# Patient Record
Sex: FEMALE | Race: WHITE | Employment: UNEMPLOYED | URBAN - METROPOLITAN AREA
[De-identification: names, ages, dates, MRNs, and addresses within clinical notes are randomized per-mention and may not be internally consistent; named-entity substitution may affect disease eponyms.]

---

## 2019-09-19 PROBLEM — R07.2 PRECORDIAL PAIN: Status: ACTIVE | Noted: 2019-09-19

## 2019-09-19 NOTE — PROGRESS NOTES
for 30 doses. 11/19/12  Yes Srinivasa Lal MD   omeprazole (PRILOSEC) 20 MG capsule Take 1 capsule by mouth Daily for 30 doses. 11/19/12 12/19/12  Srinivasa Lal MD          Allergies:  Augmentin [amoxicillin-pot clavulanate]     Review of Systems:   A 14 point review of symptoms completed. Pertinent positives identified in the HPI, all other review of symptoms negative as below.       Objective   PHYSICAL EXAM:    Vitals:    09/20/19 1525   BP: 104/80   Pulse: 78   SpO2: 98%    Weight: 278 lb (126.1 kg)         General Appearance:  Alert, cooperative, no distress, appears stated age   Head:  Normocephalic, without obvious abnormality, atraumatic   Eyes:  PERRL, conjunctiva/corneas clear   Nose: Nares normal, no drainage or sinus tenderness   Throat: Lips, mucosa, and tongue normal   Neck: Supple, symmetrical, trachea midline, no adenopathy, thyroid: not enlarged, symmetric, no tenderness/mass/nodules, no carotid bruit or JVD   Lungs:   Clear to auscultation bilaterally, respirations unlabored   Chest Wall:  No deformity or tenderness   Heart:  Regular rate and rhythm, S1, S2 normal, no murmur, rub or gallop   Abdomen:   Soft, non-tender, bowel sounds active all four quadrants,  no masses, no organomegaly   Extremities: Extremities normal, atraumatic, no cyanosis or edema   Pulses: 2+ and symmetric   Skin: Skin color, texture, turgor normal, no rashes or lesions   Pysch: Normal mood and affect   Neurologic: Normal gross motor and sensory exam.         Labs   CBC:   Lab Results   Component Value Date    WBC 10.3 11/18/2012    RBC 4.42 11/18/2012    HGB 12.8 11/18/2012    HCT 39.2 11/18/2012    MCV 88.7 11/18/2012    RDW 14.3 11/18/2012     11/18/2012     CMP:  Lab Results   Component Value Date     11/19/2012    K 4.3 11/19/2012     11/19/2012    CO2 26 11/19/2012    BUN 17 11/19/2012    CREATININE 0.8 11/19/2012    GFRAA >60 11/19/2012    AGRATIO 1.4 11/19/2012    GLUCOSE 96 scribed note accurately describes my personal service to the patient.

## 2019-09-20 ENCOUNTER — OFFICE VISIT (OUTPATIENT)
Dept: CARDIOLOGY CLINIC | Age: 39
End: 2019-09-20
Payer: COMMERCIAL

## 2019-09-20 VITALS
HEART RATE: 78 BPM | BODY MASS INDEX: 49.26 KG/M2 | OXYGEN SATURATION: 98 % | DIASTOLIC BLOOD PRESSURE: 80 MMHG | HEIGHT: 63 IN | WEIGHT: 278 LBS | SYSTOLIC BLOOD PRESSURE: 104 MMHG

## 2019-09-20 DIAGNOSIS — R06.02 SOB (SHORTNESS OF BREATH): ICD-10-CM

## 2019-09-20 DIAGNOSIS — R07.2 PRECORDIAL PAIN: ICD-10-CM

## 2019-09-20 PROCEDURE — 99203 OFFICE O/P NEW LOW 30 MIN: CPT | Performed by: INTERNAL MEDICINE

## 2019-09-20 PROCEDURE — 93000 ELECTROCARDIOGRAM COMPLETE: CPT | Performed by: INTERNAL MEDICINE

## 2019-09-20 PROCEDURE — G8427 DOCREV CUR MEDS BY ELIG CLIN: HCPCS | Performed by: INTERNAL MEDICINE

## 2019-09-20 PROCEDURE — G8417 CALC BMI ABV UP PARAM F/U: HCPCS | Performed by: INTERNAL MEDICINE

## 2019-09-20 PROCEDURE — 1036F TOBACCO NON-USER: CPT | Performed by: INTERNAL MEDICINE

## 2019-09-20 RX ORDER — BUPROPION HYDROCHLORIDE 150 MG/1
TABLET ORAL
COMMUNITY
Start: 2019-08-26 | End: 2021-05-13 | Stop reason: ALTCHOICE

## 2019-09-20 RX ORDER — TRAZODONE HYDROCHLORIDE 100 MG/1
TABLET ORAL
COMMUNITY
Start: 2019-08-26

## 2019-09-20 RX ORDER — CHOLECALCIFEROL (VITAMIN D3) 50 MCG
TABLET ORAL
COMMUNITY
Start: 2019-08-26

## 2019-09-20 RX ORDER — TOPIRAMATE 100 MG/1
TABLET, FILM COATED ORAL
COMMUNITY
Start: 2019-08-26

## 2019-09-20 RX ORDER — RIZATRIPTAN BENZOATE 10 MG/1
TABLET, ORALLY DISINTEGRATING ORAL
COMMUNITY
Start: 2019-08-26

## 2019-10-08 ENCOUNTER — HOSPITAL ENCOUNTER (EMERGENCY)
Age: 39
Discharge: HOME | End: 2019-10-08
Attending: EMERGENCY MEDICINE
Payer: COMMERCIAL

## 2019-10-08 VITALS
RESPIRATION RATE: 17 BRPM | SYSTOLIC BLOOD PRESSURE: 116 MMHG | HEIGHT: 63 IN | HEART RATE: 80 BPM | WEIGHT: 240 LBS | OXYGEN SATURATION: 97 % | DIASTOLIC BLOOD PRESSURE: 57 MMHG | TEMPERATURE: 98.5 F | BODY MASS INDEX: 42.52 KG/M2

## 2019-10-08 DIAGNOSIS — J06.9 ACUTE UPPER RESPIRATORY INFECTION: Primary | ICD-10-CM

## 2019-10-08 PROCEDURE — 99282 EMERGENCY DEPT VISIT SF MDM: CPT

## 2019-10-08 PROCEDURE — 6360000002 HC RX W HCPCS: Performed by: EMERGENCY MEDICINE

## 2019-10-08 RX ORDER — OXYMETAZOLINE HYDROCHLORIDE 0.05 G/100ML
2 SPRAY NASAL 2 TIMES DAILY
Qty: 1 BOTTLE | Refills: 0 | Status: SHIPPED | OUTPATIENT
Start: 2019-10-08 | End: 2019-10-11

## 2019-10-08 RX ORDER — DEXAMETHASONE 4 MG/1
12 TABLET ORAL ONCE
Status: COMPLETED | OUTPATIENT
Start: 2019-10-08 | End: 2019-10-08

## 2019-10-08 RX ADMIN — DEXAMETHASONE 12 MG: 4 TABLET ORAL at 04:29

## 2019-10-08 ASSESSMENT — PAIN DESCRIPTION - PAIN TYPE: TYPE: ACUTE PAIN

## 2019-10-08 ASSESSMENT — PAIN SCALES - GENERAL: PAINLEVEL_OUTOF10: 3

## 2019-10-08 ASSESSMENT — PAIN DESCRIPTION - LOCATION: LOCATION: EAR

## 2019-10-08 ASSESSMENT — PAIN DESCRIPTION - ORIENTATION: ORIENTATION: RIGHT

## 2019-11-23 ENCOUNTER — HOSPITAL ENCOUNTER (EMERGENCY)
Age: 39
Discharge: HOME | End: 2019-11-23
Attending: EMERGENCY MEDICINE
Payer: COMMERCIAL

## 2019-11-23 VITALS
RESPIRATION RATE: 18 BRPM | BODY MASS INDEX: 42.51 KG/M2 | HEART RATE: 78 BPM | DIASTOLIC BLOOD PRESSURE: 71 MMHG | TEMPERATURE: 98 F | OXYGEN SATURATION: 96 % | SYSTOLIC BLOOD PRESSURE: 119 MMHG | WEIGHT: 240 LBS

## 2019-11-23 DIAGNOSIS — G43.901 STATUS MIGRAINOSUS: Primary | ICD-10-CM

## 2019-11-23 PROCEDURE — 96375 TX/PRO/DX INJ NEW DRUG ADDON: CPT

## 2019-11-23 PROCEDURE — 96361 HYDRATE IV INFUSION ADD-ON: CPT

## 2019-11-23 PROCEDURE — 2580000003 HC RX 258: Performed by: EMERGENCY MEDICINE

## 2019-11-23 PROCEDURE — 96374 THER/PROPH/DIAG INJ IV PUSH: CPT

## 2019-11-23 PROCEDURE — 6360000002 HC RX W HCPCS: Performed by: EMERGENCY MEDICINE

## 2019-11-23 PROCEDURE — 99282 EMERGENCY DEPT VISIT SF MDM: CPT

## 2019-11-23 RX ORDER — DIPHENHYDRAMINE HYDROCHLORIDE 50 MG/ML
25 INJECTION INTRAMUSCULAR; INTRAVENOUS ONCE
Status: COMPLETED | OUTPATIENT
Start: 2019-11-23 | End: 2019-11-23

## 2019-11-23 RX ORDER — METOCLOPRAMIDE 10 MG/1
10 TABLET ORAL 4 TIMES DAILY PRN
Qty: 20 TABLET | Refills: 0 | Status: SHIPPED | OUTPATIENT
Start: 2019-11-23

## 2019-11-23 RX ORDER — 0.9 % SODIUM CHLORIDE 0.9 %
1000 INTRAVENOUS SOLUTION INTRAVENOUS ONCE
Status: COMPLETED | OUTPATIENT
Start: 2019-11-23 | End: 2019-11-23

## 2019-11-23 RX ORDER — KETOROLAC TROMETHAMINE 30 MG/ML
15 INJECTION, SOLUTION INTRAMUSCULAR; INTRAVENOUS ONCE
Status: COMPLETED | OUTPATIENT
Start: 2019-11-23 | End: 2019-11-23

## 2019-11-23 RX ORDER — METOCLOPRAMIDE HYDROCHLORIDE 5 MG/ML
10 INJECTION INTRAMUSCULAR; INTRAVENOUS ONCE
Status: COMPLETED | OUTPATIENT
Start: 2019-11-23 | End: 2019-11-23

## 2019-11-23 RX ADMIN — METOCLOPRAMIDE 10 MG: 5 INJECTION, SOLUTION INTRAMUSCULAR; INTRAVENOUS at 21:46

## 2019-11-23 RX ADMIN — KETOROLAC TROMETHAMINE 15 MG: 30 INJECTION, SOLUTION INTRAMUSCULAR at 21:46

## 2019-11-23 RX ADMIN — SODIUM CHLORIDE 1000 ML: 9 INJECTION, SOLUTION INTRAVENOUS at 21:45

## 2019-11-23 RX ADMIN — DIPHENHYDRAMINE HYDROCHLORIDE 25 MG: 50 INJECTION, SOLUTION INTRAMUSCULAR; INTRAVENOUS at 21:46

## 2019-11-23 ASSESSMENT — PAIN SCALES - GENERAL
PAINLEVEL_OUTOF10: 8
PAINLEVEL_OUTOF10: 3
PAINLEVEL_OUTOF10: 8

## 2019-11-23 ASSESSMENT — PAIN DESCRIPTION - PAIN TYPE: TYPE: ACUTE PAIN

## 2019-11-23 ASSESSMENT — PAIN DESCRIPTION - LOCATION: LOCATION: HEAD

## 2021-02-26 ENCOUNTER — APPOINTMENT (OUTPATIENT)
Dept: GENERAL RADIOLOGY | Age: 41
End: 2021-02-26
Payer: COMMERCIAL

## 2021-02-26 ENCOUNTER — HOSPITAL ENCOUNTER (EMERGENCY)
Age: 41
Discharge: HOME | End: 2021-02-26
Attending: EMERGENCY MEDICINE
Payer: COMMERCIAL

## 2021-02-26 VITALS
OXYGEN SATURATION: 100 % | DIASTOLIC BLOOD PRESSURE: 72 MMHG | HEIGHT: 63 IN | WEIGHT: 240 LBS | SYSTOLIC BLOOD PRESSURE: 139 MMHG | HEART RATE: 74 BPM | TEMPERATURE: 98 F | BODY MASS INDEX: 42.52 KG/M2 | RESPIRATION RATE: 16 BRPM

## 2021-02-26 DIAGNOSIS — S99.911A INJURY OF RIGHT ANKLE, INITIAL ENCOUNTER: Primary | ICD-10-CM

## 2021-02-26 PROCEDURE — 99283 EMERGENCY DEPT VISIT LOW MDM: CPT

## 2021-02-26 PROCEDURE — 73610 X-RAY EXAM OF ANKLE: CPT

## 2021-02-26 ASSESSMENT — PAIN SCALES - GENERAL: PAINLEVEL_OUTOF10: 7

## 2021-02-26 ASSESSMENT — PAIN DESCRIPTION - LOCATION: LOCATION: ANKLE

## 2021-02-26 NOTE — ED NOTES
Applied a M sized [employer] air boot to the pt's right ankle for added pt comfort and support. Instructed pt of all at home care for the boot; pt understood all directions and had no additional questions.        Manda Ashton  02/26/21 9132

## 2021-02-27 ASSESSMENT — ENCOUNTER SYMPTOMS: COLOR CHANGE: 0

## 2021-02-28 NOTE — ED PROVIDER NOTES
[facility]  ED  EMERGENCYDEPARTMENT ENCOUNTER      Pt Name: Adina Macias  MRN: [id number]  Yulissagfsallie 1980  Date of evaluation: 2/26/2021  Liliane Martins MD    CHIEF COMPLAINT       Chief Complaint   Patient presents with    Fall     fall last saturday and now has right ankle pain and swelling. Pt ambulatory from triage to room. HISTORY OF PRESENT ILLNESS   (Location/Symptom, Timing/Onset,Context/Setting, Quality, Duration, Modifying Factors, Severity)  Note limiting factors. Adina Macias is a 38 y.o. female who presents to the emergency department for right ankle pain. Patient reports she slipped and fell last week and since then has continued to have pain and swelling to right ankle. Has taken ibuprofen with improvement of symptoms. Denies numbness/tingling. HPI    Nursing Notes were reviewed. REVIEW OF SYSTEMS    (2-9 systems for level 4, 10 or more for level 5)     Review of Systems   Musculoskeletal: Positive for gait problem and joint swelling. Skin: Negative for color change. Except as noted above the remainder of the review of systems was reviewedand negative. PAST MEDICAL HISTORY   History reviewed. No pertinent past medical history. SURGICAL HISTORY     History reviewed. No pertinent surgical history. CURRENT MEDICATIONS       Discharge Medication List as of 2/26/2021  4:50 PM      CONTINUE these medications which have NOT CHANGED    Details   metoclopramide (REGLAN) 10 MG tablet Take 1 tablet by mouth 4 times daily as needed (headache) Please take 25 mg Benadryl pill with this medicine. You may take additional Benadryl if you have any unusual muscle movements. , Disp-20 tablet, R-0Print      benzocaine-menthol (CEPACOL SORE THROAT) 15-3.6 MG lozenge Take 1 lozenge by mouth every 2 hours as needed for Sore Throat, Disp-20 lozenge, R-0Print      rizatriptan (MAXALT-MLT) 10 MG disintegrating tablet Historical Med      topiramate (TOPAMAX) SCREENINGS             PHYSICAL EXAM    (up to 7 for level 4, 8 ormore for level 5)     ED Triage Vitals   BP Temp Temp Source Pulse Resp SpO2 Height Weight   02/26/21 1611 02/26/21 1611 02/26/21 1611 02/26/21 1557 02/26/21 1611 02/26/21 1557 02/26/21 1608 02/26/21 1608   (!) 141/72 98 °F (36.7 °C) Oral 102 16 96 % 5' 3\" (1.6 m) 240 lb (108.9 kg)       Physical Exam  Vitals signs and nursing note reviewed. Constitutional:       General: She is not in acute distress. Appearance: She is well-developed. She is not ill-appearing, toxic-appearing or diaphoretic. Comments: Well-appearing, nontoxic, not in acute distress. Answers questions in full sentences and following verbal commands appropriately   HENT:      Head: Normocephalic and atraumatic. Eyes:      General:         Right eye: No discharge. Left eye: No discharge. Conjunctiva/sclera: Conjunctivae normal.   Neck:      Musculoskeletal: Normal range of motion and neck supple. Cardiovascular:      Pulses:           Dorsalis pedis pulses are 2+ on the right side. Pulmonary:      Effort: Pulmonary effort is normal. No respiratory distress. Musculoskeletal: Normal range of motion. Right ankle: She exhibits swelling. Tenderness. Lateral malleolus tenderness found. Skin:     Coloration: Skin is not pale. Neurological:      Mental Status: She is alert and oriented to person, place, and time. Psychiatric:         Behavior: Behavior normal. Behavior is cooperative.          DIAGNOSTIC RESULTS     EKG: All EKG's are interpreted by the Emergency Department Physicianwho either signs or Co-signs this chart in the absence of a cardiologist.      RADIOLOGY:   Non-plain film images such as CT, Ultrasound and MRI are read by the radiologist. Plain radiographic images are visualized and preliminarily interpreted by the emergency physician with the below findings:      Interpretation per the Radiologist below, if available at the time of this note:    XR ANKLE RIGHT (MIN 3 VIEWS)   Final Result   No acute osseous abnormality of the ankle. Lateral ankle soft tissue swelling. ED BEDSIDE ULTRASOUND:   Performed by ED Physician - none    LABS:  Labs Reviewed - No data to display    All other labs were within normal range ornot returned as of this dictation. EMERGENCY DEPARTMENT COURSE and DIFFERENTIAL DIAGNOSIS/MDM:   Vitals:    Vitals:    02/26/21 1557 02/26/21 1608 02/26/21 1611 02/26/21 1659   BP:   (!) 141/72 139/72   Pulse: 102  81 74   Resp:   16 16   Temp:   98 °F (36.7 °C) 98 °F (36.7 °C)   TempSrc:   Oral Oral   SpO2: 96%  98% 100%   Weight:  240 lb (108.9 kg)     Height:  5' 3\" (1.6 m)           MDM    ED COURSE/MDM    -Jonelle Pedroza is a 38 y.o. female to ED for right ankle pain and swelling has been going on for several days after a fall. On examination there is some slight swelling to lateral malleolus which is where patient has maximal pain. DP pulse intact with brisk cap refill. -X-ray of right ankle shows no acute osseous abnormality of the ankle. Lateral ankle soft tissue swelling.  - imaging reviewed and results discussed with patient. the patient does not have any acute findings, as she expressed that it is difficult to bear weight, she will be put on a walker boot for comfort purposes. Instructed her to use it as needed however can discontinue when symptoms improve. Directed to take Tylenol or ibuprofen for pain management, can also apply ice to the area. Strict ED return precautions given for new/worsending symptoms. Patient in agreement withplan, verbally confirm understanding and have no further questions/concerns. REASSESSMENT      Well appearing, non toxic, alert, oriented speaking in full sentences and hemodynamically stable upon discharge      CRITICAL CARE TIME   Total Critical Care time was 0 minutes, excluding separately reportableprocedures.   There was a high probability of

## 2021-05-13 ENCOUNTER — HOSPITAL ENCOUNTER (EMERGENCY)
Age: 41
Discharge: LEFT BEFORE DISCHARGE | End: 2021-05-13
Attending: EMERGENCY MEDICINE
Payer: COMMERCIAL

## 2021-05-13 VITALS
WEIGHT: 240 LBS | SYSTOLIC BLOOD PRESSURE: 148 MMHG | HEIGHT: 64 IN | DIASTOLIC BLOOD PRESSURE: 93 MMHG | TEMPERATURE: 98.1 F | RESPIRATION RATE: 17 BRPM | OXYGEN SATURATION: 100 % | BODY MASS INDEX: 40.97 KG/M2 | HEART RATE: 104 BPM

## 2021-05-13 DIAGNOSIS — R19.7 NAUSEA VOMITING AND DIARRHEA: ICD-10-CM

## 2021-05-13 DIAGNOSIS — R11.2 NAUSEA VOMITING AND DIARRHEA: ICD-10-CM

## 2021-05-13 DIAGNOSIS — H66.90 OTITIS MEDIA, UNSPECIFIED LATERALITY, UNSPECIFIED OTITIS MEDIA TYPE: Primary | ICD-10-CM

## 2021-05-13 PROCEDURE — 99283 EMERGENCY DEPT VISIT LOW MDM: CPT

## 2021-05-13 RX ORDER — AZITHROMYCIN 250 MG/1
TABLET, FILM COATED ORAL
Qty: 1 PACKET | Refills: 0 | Status: SHIPPED | OUTPATIENT
Start: 2021-05-13 | End: 2021-05-23

## 2021-05-13 RX ORDER — ONDANSETRON 4 MG/1
4 TABLET, ORALLY DISINTEGRATING ORAL ONCE
Status: DISCONTINUED | OUTPATIENT
Start: 2021-05-13 | End: 2021-05-13 | Stop reason: HOSPADM

## 2021-05-13 RX ORDER — ONDANSETRON 4 MG/1
4 TABLET, ORALLY DISINTEGRATING ORAL EVERY 8 HOURS PRN
Qty: 20 TABLET | Refills: 0 | Status: SHIPPED | OUTPATIENT
Start: 2021-05-13

## 2021-05-13 NOTE — ED NOTES
Went to room to medication pt and no one in room. Will consider pt eloped. No discharge papers able to be given.       Jolanta Perez RN  05/13/21 7011

## 2021-05-13 NOTE — ED PROVIDER NOTES
[address] ED    CHIEF COMPLAINT  Emesis (Patient seen by urgent care earlier today and diagnosed with bilateral ear infection. Patient vomited 4 times after taking amoxicillin tonight.)       HISTORY OF PRESENT ILLNESS  Susy Fonseca is a 38 y.o. female who presents to the ED with complaint of ear and sinus pain. Was seen at an Urgent Care yesterday and was prescribed prednisone and amoxicillin. Several hours later, developed several episodes of vomiting. 4 episodes of vomiting in two hours. Denies fever, chest pain, SOB. Diarrhea started on arrival to ED. Emesis and diarrhea are nonbloody. Complains of diffuse abdominal pain, stabbing, constant, no exacerbating or ameliorating factors, 7/10 intensity. Denies dysuria, hematuria, vaginal bleeding/discharge. Patient is s/p tubal ligation, declines pregnancy test.    No other complaints, modifying factors or associated symptoms. I have reviewed the following from the nursing documentation:    No past medical history on file. No past surgical history on file. No family history on file. Social History     Socioeconomic History    Marital status:      Spouse name: Not on file    Number of children: Not on file    Years of education: Not on file    Highest education level: Not on file   Occupational History    Not on file   Social Needs    Financial resource strain: Not on file    Food insecurity     Worry: Not on file     Inability: Not on file    Transportation needs     Medical: Not on file     Non-medical: Not on file   Tobacco Use    Smoking status: Never Smoker    Smokeless tobacco: Never Used   Substance and Sexual Activity    Alcohol use:  Yes    Drug use: Never    Sexual activity: Yes     Partners: Male   Lifestyle    Physical activity     Days per week: Not on file     Minutes per session: Not on file    Stress: Not on file   Relationships    Social connections     Talks on phone: Not on file     Gets together: Not on file     Attends Nondenominational service: Not on file     Active member of club or organization: Not on file     Attends meetings of clubs or organizations: Not on file     Relationship status: Not on file    Intimate partner violence     Fear of current or ex partner: Not on file     Emotionally abused: Not on file     Physically abused: Not on file     Forced sexual activity: Not on file   Other Topics Concern    Not on file   Social History Narrative    Not on file     Current Facility-Administered Medications   Medication Dose Route Frequency Provider Last Rate Last Admin    ondansetron (ZOFRAN-ODT) disintegrating tablet 4 mg  4 mg Oral Once Melissa Gonzalez MD         Current Outpatient Medications   Medication Sig Dispense Refill    azithromycin (ZITHROMAX) 250 MG tablet Take 2 tablets (500 mg) on Day 1, followed by 1 tablet (250 mg) once daily on Days 2 through 5. 1 packet 0    ondansetron (ZOFRAN ODT) 4 MG disintegrating tablet Take 1 tablet by mouth every 8 hours as needed for Nausea 20 tablet 0    metoclopramide (REGLAN) 10 MG tablet Take 1 tablet by mouth 4 times daily as needed (headache) Please take 25 mg Benadryl pill with this medicine. You may take additional Benadryl if you have any unusual muscle movements. 20 tablet 0    benzocaine-menthol (CEPACOL SORE THROAT) 15-3.6 MG lozenge Take 1 lozenge by mouth every 2 hours as needed for Sore Throat 20 lozenge 0    rizatriptan (MAXALT-MLT) 10 MG disintegrating tablet       topiramate (TOPAMAX) 100 MG tablet       traZODone (DESYREL) 100 MG tablet       Cholecalciferol (VITAMIN D) 2000 units TABS tablet       diclofenac (VOLTAREN) 75 MG EC tablet Take 1 tablet by mouth 2 times daily as needed for Pain. 30 tablet 0    omeprazole (PRILOSEC) 20 MG capsule Take 1 capsule by mouth Daily for 30 doses. 30 capsule 0    dicyclomine (BENTYL) 20 MG tablet Take 1 tablet by mouth every 6 hours as needed (abdominal cramping/pain) for 30 doses.  30 tablet 0 Allergies   Allergen Reactions    Augmentin [Amoxicillin-Pot Clavulanate]      GI issues       REVIEW OF SYSTEMS  10 systems reviewed, pertinent positives and negatives per HPI, otherwise noted to be negative. PHYSICAL EXAM  ED Triage Vitals [05/13/21 0129]   BP Temp Temp Source Pulse Resp SpO2 Height Weight   138/88 98.1 °F (36.7 °C) Oral 104 17 97 % 5' 4\" (1.626 m) 240 lb (108.9 kg)     General appearance: Awake and alert. Cooperative. No acute distress. HENT: Normocephalic. Atraumatic. Mucous membranes are moist. EACs with trace cerumen. TMs WNL. No mastoid tenderness. Neck: Supple. No meningismus. Eyes: PERRL. EOMI. Heart/Chest: RRR. No murmurs. Lungs: Respirations unlabored. CTAB. Good air exchange. Speaking comfortably in full sentences. Abdomen: Soft. Non-tender. Non-distended. No rebound or guarding. Musculoskeletal: No extremity edema. No deformity. No tenderness in the extremities. All extremities neurovascularly intact. Skin: Warm and dry. No acute rashes. Neurological: Alert and oriented. CN II-XII intact. Strength 5/5 bilateral upper and lower extremities. Sensation intact to light touch. Gait normal.  Psychiatric: Mood/affect: normal      LABS  I have reviewed all labs for this visit. No results found for this visit on 05/13/21. RADIOLOGY  I have reviewed all radiographic studies for this visit. No orders to display        ED COURSE/MDM  Patient seen and evaluated. Old records reviewed. Labs and imaging reviewed and results discussed with patient/family to extent possible. This is a 69-year-old female who presents with nausea, vomiting, diarrhea in the setting of starting amoxicillin for otitis media. On arrival the patient is well in appearance. Initial vitals notable for mild tachycardia. Examination of the ears bilaterally shows no evidence of acute otitis media. There is no concern for mastoiditis. Patient's abdominal exam is benign.   As I am not sure what the initial evaluation of the ears demonstrated, we will plan to discontinue amoxicillin given the Augmentin allergy. Will prescribe Z-Jorge instead. We will also prescribe Zofran. 4 mg of Zofran administered here. The abdominal complaints are not consistent with appendicitis, pyelonephritis, bowel obstruction, bowel perforation, ovarian pathology. Discharged home with the above medications, close follow-up with PCP in several days, usual strict return precautions for new or worsening symptoms communicated. During the patient's ED course, the patient was given:  Medications   ondansetron (ZOFRAN-ODT) disintegrating tablet 4 mg (has no administration in time range)        All questions were answered and the patient/family expressed understanding and agreement with the plan. PROCEDURES  None    CRITICAL CARE  N/A    CLINICAL IMPRESSION  1. Otitis media, unspecified laterality, unspecified otitis media type    2. Nausea vomiting and diarrhea        DISPOSITION   discharge     Danette Casillas MD    Note: This chart was created using voice recognition dictation software. Efforts were made by me to ensure accuracy, however some errors may be present due to limitations of this technology and occasionally words are not transcribed correctly.         Danette Casillas MD  05/13/21 4541

## 2021-05-13 NOTE — ED NOTES
Notified Dr. Terri Evans that pt not in room and considered eloped.       Edmundo Hopson RN  05/13/21 7136

## 2021-12-30 ENCOUNTER — HOSPITAL ENCOUNTER (OUTPATIENT)
Dept: GENERAL RADIOLOGY | Age: 41
Discharge: HOME | End: 2021-12-30
Payer: COMMERCIAL

## 2021-12-30 ENCOUNTER — HOSPITAL ENCOUNTER (OUTPATIENT)
Age: 41
Discharge: HOME | End: 2021-12-30
Payer: COMMERCIAL

## 2021-12-30 DIAGNOSIS — M25.512 ACUTE PAIN OF LEFT SHOULDER: ICD-10-CM

## 2021-12-30 PROCEDURE — 73030 X-RAY EXAM OF SHOULDER: CPT

## 2024-06-09 ENCOUNTER — HOSPITAL ENCOUNTER (EMERGENCY)
Age: 44
Discharge: HOME | End: 2024-06-09
Attending: EMERGENCY MEDICINE
Payer: COMMERCIAL

## 2024-06-09 VITALS
TEMPERATURE: 97.8 F | SYSTOLIC BLOOD PRESSURE: 118 MMHG | HEIGHT: 64 IN | WEIGHT: 262.57 LBS | RESPIRATION RATE: 14 BRPM | HEART RATE: 70 BPM | DIASTOLIC BLOOD PRESSURE: 70 MMHG | BODY MASS INDEX: 44.83 KG/M2 | OXYGEN SATURATION: 100 %

## 2024-06-09 DIAGNOSIS — H92.09 OTALGIA, UNSPECIFIED LATERALITY: Primary | ICD-10-CM

## 2024-06-09 LAB — S PYO AG THROAT QL: NEGATIVE

## 2024-06-09 PROCEDURE — 99283 EMERGENCY DEPT VISIT LOW MDM: CPT

## 2024-06-09 PROCEDURE — 87880 STREP A ASSAY W/OPTIC: CPT

## 2024-06-09 PROCEDURE — 87081 CULTURE SCREEN ONLY: CPT

## 2024-06-09 ASSESSMENT — PAIN SCALES - GENERAL: PAINLEVEL_OUTOF10: 5

## 2024-06-09 ASSESSMENT — PAIN DESCRIPTION - DESCRIPTORS: DESCRIPTORS: ACHING

## 2024-06-09 ASSESSMENT — PAIN DESCRIPTION - FREQUENCY: FREQUENCY: CONTINUOUS

## 2024-06-09 ASSESSMENT — PAIN DESCRIPTION - LOCATION: LOCATION: THROAT;EAR

## 2024-06-09 ASSESSMENT — PAIN - FUNCTIONAL ASSESSMENT
PAIN_FUNCTIONAL_ASSESSMENT: NONE - DENIES PAIN
PAIN_FUNCTIONAL_ASSESSMENT: 0-10

## 2024-06-09 ASSESSMENT — LIFESTYLE VARIABLES
HOW OFTEN DO YOU HAVE A DRINK CONTAINING ALCOHOL: MONTHLY OR LESS
HOW MANY STANDARD DRINKS CONTAINING ALCOHOL DO YOU HAVE ON A TYPICAL DAY: 1 OR 2

## 2024-06-09 ASSESSMENT — PAIN DESCRIPTION - PAIN TYPE: TYPE: ACUTE PAIN

## 2024-06-09 ASSESSMENT — PAIN DESCRIPTION - ORIENTATION: ORIENTATION: LEFT;RIGHT

## 2024-06-09 ASSESSMENT — PAIN DESCRIPTION - ONSET: ONSET: ON-GOING

## 2024-06-09 NOTE — ED PROVIDER NOTES
file     No current facility-administered medications for this encounter.     Current Outpatient Medications   Medication Sig Dispense Refill    rizatriptan (MAXALT-MLT) 10 MG disintegrating tablet       topiramate (TOPAMAX) 100 MG tablet       Cholecalciferol (VITAMIN D) 2000 units TABS tablet        Allergies   Allergen Reactions    Augmentin [Amoxicillin-Pot Clavulanate]      GI issues         PHYSICAL EXAM  ED Triage Vitals [06/09/24 1113]   BP Temp Temp Source Pulse Respirations SpO2 Height Weight - Scale   118/70 97.8 °F (36.6 °C) Oral 70 14 100 % 1.626 m (5' 4\") 119.1 kg (262 lb 9.1 oz)     General appearance: Awake and alert. Cooperative. No acute distress.  HEENT: Normocephalic. Atraumatic. Mucous membranes are moist.  Tympanic membranes are pearly, gray, nonerythematous, nonbulging.  External auditory canals are clear.  No mastoid tenderness to palpation.  Mild diffuse erythema of the posterior pharynx.  No tonsillar exudate or uvular deviation.  Managing secretions easily.   Neck: No meningismus.    Musculoskeletal: No extremity edema. No deformity.   Skin: Warm and dry. No acute rashes.       LABS  I have reviewed all labs for this visit.   Results for orders placed or performed during the hospital encounter of 06/09/24   Strep Screen Group A Throat    Specimen: Throat   Result Value Ref Range    Rapid Strep A Screen Negative Negative       ED COURSE/MDM    43 y.o. female presents with sore throat and ear pain.  Presentation not consistent with acute otitis media or otitis externa.  Not consistent with mastoiditis.  With respect to the sore throat, there is no focal erythema or uvular deviation.  Not consistent with PTA.  Patient afebrile and nontoxic in appearance.  Not consistent with RPA or bacterial tracheitis.  Will trial ibuprofen/acetaminophen, gargle with warm salt water.  Afrin times several days.  Close PCP follow-up.  Usual strict return precautions.     ITate MD, am the primary

## 2024-06-09 NOTE — DISCHARGE INSTRUCTIONS
Dear Stacie Nelson,     Thank you for the privilege of caring for you today in the Emergency Department.     Your rapid strep test was negative. We will contact you if the strep culture is positive.     Take ibuprofen, up to 600 mg four times per day every day with food for the next 3-5 days, then as needed and directed on the bottle for continued pain.You may alternate this with up to 650 mg of acetaminophen (Tylenol), every six hours. Do not take more than 3000 mg of acetaminophen from all sources in a 24-hour period.      Use Afrin, over the counter, as directed on package. Do not use for more than 3 days.     Gargle with warm salt water 3-4 times daily.     Please return to the Emergency Department if you develop any new or worsening symptoms, fever, dizziness, hearing loss, or for any other concerns.     Regards,     Tate Saleem MD, FACEP

## 2024-06-09 NOTE — ED NOTES
Patient presents to the ED via walk in with partner for ear pain and sore throat.  Patient states initial onset was 1 week ago with a sore throat and mild cough. Then states a few days ago bilateral ear pain began with more pain to left than right..  Patient denies fevers, shortness of breath, nausea/vomiting.  Patient alert and oriented, respirations even and easy.

## 2024-06-11 LAB — S PYO THROAT QL CULT: NORMAL

## 2024-11-02 ENCOUNTER — HOSPITAL ENCOUNTER (EMERGENCY)
Age: 44
Discharge: HOME | End: 2024-11-02
Attending: STUDENT IN AN ORGANIZED HEALTH CARE EDUCATION/TRAINING PROGRAM
Payer: COMMERCIAL

## 2024-11-02 ENCOUNTER — APPOINTMENT (OUTPATIENT)
Dept: GENERAL RADIOLOGY | Age: 44
End: 2024-11-02
Payer: COMMERCIAL

## 2024-11-02 VITALS
SYSTOLIC BLOOD PRESSURE: 130 MMHG | HEART RATE: 80 BPM | OXYGEN SATURATION: 97 % | DIASTOLIC BLOOD PRESSURE: 88 MMHG | HEIGHT: 60 IN | BODY MASS INDEX: 49.77 KG/M2 | TEMPERATURE: 98 F | RESPIRATION RATE: 16 BRPM | WEIGHT: 253.53 LBS

## 2024-11-02 DIAGNOSIS — M25.511 CHRONIC RIGHT SHOULDER PAIN: Primary | ICD-10-CM

## 2024-11-02 DIAGNOSIS — G89.29 CHRONIC RIGHT SHOULDER PAIN: Primary | ICD-10-CM

## 2024-11-02 PROCEDURE — 96372 THER/PROPH/DIAG INJ SC/IM: CPT

## 2024-11-02 PROCEDURE — 73030 X-RAY EXAM OF SHOULDER: CPT

## 2024-11-02 PROCEDURE — 6360000002 HC RX W HCPCS: Performed by: STUDENT IN AN ORGANIZED HEALTH CARE EDUCATION/TRAINING PROGRAM

## 2024-11-02 PROCEDURE — 6370000000 HC RX 637 (ALT 250 FOR IP): Performed by: STUDENT IN AN ORGANIZED HEALTH CARE EDUCATION/TRAINING PROGRAM

## 2024-11-02 PROCEDURE — 99284 EMERGENCY DEPT VISIT MOD MDM: CPT

## 2024-11-02 RX ORDER — LIDOCAINE 4 G/G
1 PATCH TOPICAL ONCE
Status: DISCONTINUED | OUTPATIENT
Start: 2024-11-02 | End: 2024-11-02 | Stop reason: HOSPADM

## 2024-11-02 RX ORDER — KETOROLAC TROMETHAMINE 15 MG/ML
15 INJECTION, SOLUTION INTRAMUSCULAR; INTRAVENOUS ONCE
Status: COMPLETED | OUTPATIENT
Start: 2024-11-02 | End: 2024-11-02

## 2024-11-02 RX ADMIN — KETOROLAC TROMETHAMINE 15 MG: 15 INJECTION, SOLUTION INTRAMUSCULAR; INTRAVENOUS at 16:32

## 2024-11-02 ASSESSMENT — PAIN DESCRIPTION - FREQUENCY
FREQUENCY: CONTINUOUS
FREQUENCY: CONTINUOUS

## 2024-11-02 ASSESSMENT — PAIN DESCRIPTION - DESCRIPTORS: DESCRIPTORS: ACHING;SORE

## 2024-11-02 ASSESSMENT — PAIN DESCRIPTION - PAIN TYPE
TYPE: CHRONIC PAIN
TYPE: CHRONIC PAIN

## 2024-11-02 ASSESSMENT — PAIN - FUNCTIONAL ASSESSMENT
PAIN_FUNCTIONAL_ASSESSMENT: 0-10
PAIN_FUNCTIONAL_ASSESSMENT: 0-10

## 2024-11-02 ASSESSMENT — PAIN DESCRIPTION - ONSET
ONSET: ON-GOING
ONSET: ON-GOING

## 2024-11-02 ASSESSMENT — PAIN DESCRIPTION - LOCATION: LOCATION: SHOULDER

## 2024-11-02 ASSESSMENT — PAIN SCALES - GENERAL
PAINLEVEL_OUTOF10: 10
PAINLEVEL_OUTOF10: 8
PAINLEVEL_OUTOF10: 10

## 2024-11-02 ASSESSMENT — PAIN DESCRIPTION - ORIENTATION: ORIENTATION: RIGHT

## 2024-11-02 NOTE — DISCHARGE INSTRUCTIONS
Today you are seen here in emergency department for shoulder pain.  Your x-ray does show evidence of arthropathy should be due to arthritis.  Please return for fevers, chills and worsening pain.  Also return for chest pain difficulty    XR SHOULDER RIGHT (MIN 2 VIEWS)   Final Result   Mild acromioclavicular arthropathy.

## 2024-11-02 NOTE — ED PROVIDER NOTES
EMERGENCY DEPARTMENT ENCOUNTER      CHIEF COMPLAINT    No chief complaint on file.      HPI    Stacie Nelson is a 43 y.o. female with past medical history significant for migraine who presents shoulder pain    Here with R shoulder pain x years, that is worse in the last month or so  Reports movement that is worse with overhead motions denies trauma denies chest pain difficulty breathing denies fever chills    PAST MEDICAL HISTORY    Past Medical History:   Diagnosis Date    Migraine        SURGICAL HISTORY    History reviewed. No pertinent surgical history.    CURRENT MEDICATIONS    Current Outpatient Rx   Medication Sig Dispense Refill    QUEtiapine Fumarate (SEROQUEL PO) Take by mouth      rizatriptan (MAXALT-MLT) 10 MG disintegrating tablet       topiramate (TOPAMAX) 100 MG tablet       Cholecalciferol (VITAMIN D) 2000 units TABS tablet          ALLERGIES    Allergies   Allergen Reactions    Augmentin [Amoxicillin-Pot Clavulanate]      GI issues       Family history reviewed and noncontributory other than:  Family History   Problem Relation Age of Onset    Diabetes Mother     Diabetes Father     Diabetes Maternal Grandmother     Diabetes Maternal Grandfather     Diabetes Paternal Grandmother        Social history reviewed and noncontributory other than:  Social History     Socioeconomic History    Marital status:      Spouse name: Not on file    Number of children: Not on file    Years of education: Not on file    Highest education level: Not on file   Occupational History    Not on file   Tobacco Use    Smoking status: Never    Smokeless tobacco: Never   Substance and Sexual Activity    Alcohol use: Yes    Drug use: Never    Sexual activity: Yes     Partners: Male   Other Topics Concern    Not on file   Social History Narrative    Not on file     Social Determinants of Health     Financial Resource Strain: Not on file   Food Insecurity: Not on file   Transportation Needs: Not on file   Physical Activity:

## 2024-11-02 NOTE — ED NOTES
Dc'd to home  awake alert  resp easy and unlabored  skin warm and dry  to follow up with ortho on Monday  to continue to ice and rest shoulder  walked out with boyfriend

## 2025-06-08 ENCOUNTER — HOSPITAL ENCOUNTER (EMERGENCY)
Age: 45
Discharge: HOME | End: 2025-06-08
Payer: COMMERCIAL

## 2025-06-08 VITALS
BODY MASS INDEX: 55.7 KG/M2 | DIASTOLIC BLOOD PRESSURE: 70 MMHG | HEART RATE: 85 BPM | RESPIRATION RATE: 18 BRPM | OXYGEN SATURATION: 99 % | HEIGHT: 60 IN | TEMPERATURE: 97.9 F | SYSTOLIC BLOOD PRESSURE: 121 MMHG | WEIGHT: 283.73 LBS

## 2025-06-08 DIAGNOSIS — J06.9 VIRAL URI WITH COUGH: Primary | ICD-10-CM

## 2025-06-08 LAB
FLUAV RNA UPPER RESP QL NAA+PROBE: NEGATIVE
FLUBV AG NPH QL: NEGATIVE
S PYO AG THROAT QL: NEGATIVE
SARS-COV-2 RDRP RESP QL NAA+PROBE: NOT DETECTED

## 2025-06-08 PROCEDURE — 87804 INFLUENZA ASSAY W/OPTIC: CPT

## 2025-06-08 PROCEDURE — 87880 STREP A ASSAY W/OPTIC: CPT

## 2025-06-08 PROCEDURE — 99283 EMERGENCY DEPT VISIT LOW MDM: CPT

## 2025-06-08 PROCEDURE — 87635 SARS-COV-2 COVID-19 AMP PRB: CPT

## 2025-06-08 RX ORDER — DESVENLAFAXINE 50 MG/1
TABLET, FILM COATED, EXTENDED RELEASE ORAL
COMMUNITY
Start: 2025-05-22

## 2025-06-08 RX ORDER — BUPROPION HYDROCHLORIDE 150 MG/1
TABLET ORAL
COMMUNITY
Start: 2025-05-15

## 2025-06-08 RX ORDER — BENZONATATE 200 MG/1
200 CAPSULE ORAL 3 TIMES DAILY PRN
Qty: 15 CAPSULE | Refills: 0 | Status: SHIPPED | OUTPATIENT
Start: 2025-06-08 | End: 2025-06-18

## 2025-06-08 ASSESSMENT — PAIN - FUNCTIONAL ASSESSMENT: PAIN_FUNCTIONAL_ASSESSMENT: 0-10

## 2025-06-08 ASSESSMENT — PAIN SCALES - GENERAL: PAINLEVEL_OUTOF10: 8

## 2025-06-08 ASSESSMENT — PAIN DESCRIPTION - LOCATION: LOCATION: THROAT

## 2025-06-08 NOTE — ED NOTES
D/C: Order noted for d/c. Pt confirmed d/c paperwork  have correct name. Discharge and education instructions reviewed with patient. Teach-back successful.  Pt verbalized understanding. Pt denied questions at this time. No acute distress noted. Patient instructed to follow-up as noted - return to emergency department if symptoms worsen. Patient verbalized understanding. Discharged per EDMD with discharge instructions. Pt discharged  to private vehicle. Patient stable upon departure. Thanked patient for choosing [facility] for care.

## 2025-06-08 NOTE — ED PROVIDER NOTES
history indicate an upper respiratory infection, and there was no indication for hospitalization or further workup. The patient will be discharged with prescriptions for medications for symptom control and advised to follow up with primary care provider after several days if no improvement is seen by that time. The patient verbalized understanding and agreement with this plan of care. The patient was advised to return to the emergency department if symptoms should significantly worsen or if new and concerning symptoms should appear.     I estimate there is LOW risk for PNEUMONIA, MENINGITIS, PERITONSILLAR ABSCESS, SEPSIS, MALIGNANT OTITIS EXTERNA, OR EPIGLOTTITIS thus I consider the discharge disposition reasonable.    CRITICAL CARE TIME   N/A    FINAL IMPRESSION      1. Viral URI with cough          DISPOSITION/PLAN   DISPOSITION Decision To Discharge 06/08/2025 02:43:52 PM      PATIENT REFERRED TO:  Freya Mcclellan, TERE - CNP  [id number]  [facility] [id number] [phone]    Call in 5 days  if no improvement in symptoms, for follow-up care      DISCHARGE MEDICATIONS:  New Prescriptions    BENZONATATE (TESSALON) 200 MG CAPSULE    Take 1 capsule by mouth 3 times daily as needed for Cough       DISCONTINUED MEDICATIONS:  Discontinued Medications    No medications on file            (Please note that portions of this note were completed with a voice recognition program.  Efforts were made to edit the dictations but occasionally words are mis-transcribed.)    RIOS Elizabeth (electronically signed)        Izaiah Villela PA  06/08/25 8230